# Patient Record
Sex: MALE | Race: WHITE | Employment: FULL TIME | URBAN - METROPOLITAN AREA
[De-identification: names, ages, dates, MRNs, and addresses within clinical notes are randomized per-mention and may not be internally consistent; named-entity substitution may affect disease eponyms.]

---

## 2024-01-09 ENCOUNTER — OFFICE VISIT (OUTPATIENT)
Facility: CLINIC | Age: 54
End: 2024-01-09
Payer: COMMERCIAL

## 2024-01-09 VITALS
HEIGHT: 72 IN | BODY MASS INDEX: 21.94 KG/M2 | DIASTOLIC BLOOD PRESSURE: 70 MMHG | SYSTOLIC BLOOD PRESSURE: 113 MMHG | HEART RATE: 63 BPM | OXYGEN SATURATION: 97 % | TEMPERATURE: 98.1 F | RESPIRATION RATE: 16 BRPM | WEIGHT: 162 LBS

## 2024-01-09 DIAGNOSIS — Z00.00 ENCOUNTER FOR GENERAL ADULT MEDICAL EXAMINATION WITHOUT ABNORMAL FINDINGS: Primary | ICD-10-CM

## 2024-01-09 LAB
ALBUMIN SERPL-MCNC: 4.1 G/DL (ref 3.5–5)
ALBUMIN/GLOB SERPL: 1.5 (ref 1.1–2.2)
ALP SERPL-CCNC: 69 U/L (ref 45–117)
ALT SERPL-CCNC: 23 U/L (ref 12–78)
ANION GAP SERPL CALC-SCNC: 5 MMOL/L (ref 5–15)
AST SERPL-CCNC: 13 U/L (ref 15–37)
BASOPHILS # BLD: 0 K/UL (ref 0–0.1)
BASOPHILS NFR BLD: 1 % (ref 0–1)
BILIRUB SERPL-MCNC: 0.6 MG/DL (ref 0.2–1)
BUN SERPL-MCNC: 18 MG/DL (ref 6–20)
BUN/CREAT SERPL: 16 (ref 12–20)
CALCIUM SERPL-MCNC: 8.8 MG/DL (ref 8.5–10.1)
CHLORIDE SERPL-SCNC: 105 MMOL/L (ref 97–108)
CHOLEST SERPL-MCNC: 178 MG/DL
CO2 SERPL-SCNC: 28 MMOL/L (ref 21–32)
COMMENT:: NORMAL
CREAT SERPL-MCNC: 1.1 MG/DL (ref 0.7–1.3)
DIFFERENTIAL METHOD BLD: NORMAL
EOSINOPHIL # BLD: 0 K/UL (ref 0–0.4)
EOSINOPHIL NFR BLD: 1 % (ref 0–7)
ERYTHROCYTE [DISTWIDTH] IN BLOOD BY AUTOMATED COUNT: 12.2 % (ref 11.5–14.5)
GLOBULIN SER CALC-MCNC: 2.8 G/DL (ref 2–4)
GLUCOSE SERPL-MCNC: 91 MG/DL (ref 65–100)
HCT VFR BLD AUTO: 41.3 % (ref 36.6–50.3)
HDLC SERPL-MCNC: 82 MG/DL
HDLC SERPL: 2.2 (ref 0–5)
HGB BLD-MCNC: 14.1 G/DL (ref 12.1–17)
IMM GRANULOCYTES # BLD AUTO: 0 K/UL (ref 0–0.04)
IMM GRANULOCYTES NFR BLD AUTO: 0 % (ref 0–0.5)
LDLC SERPL CALC-MCNC: 84 MG/DL (ref 0–100)
LYMPHOCYTES # BLD: 1.7 K/UL (ref 0.8–3.5)
LYMPHOCYTES NFR BLD: 38 % (ref 12–49)
MCH RBC QN AUTO: 30.4 PG (ref 26–34)
MCHC RBC AUTO-ENTMCNC: 34.1 G/DL (ref 30–36.5)
MCV RBC AUTO: 89 FL (ref 80–99)
MONOCYTES # BLD: 0.3 K/UL (ref 0–1)
MONOCYTES NFR BLD: 7 % (ref 5–13)
NEUTS SEG # BLD: 2.3 K/UL (ref 1.8–8)
NEUTS SEG NFR BLD: 53 % (ref 32–75)
NRBC # BLD: 0 K/UL (ref 0–0.01)
NRBC BLD-RTO: 0 PER 100 WBC
PLATELET # BLD AUTO: 227 K/UL (ref 150–400)
PMV BLD AUTO: 10 FL (ref 8.9–12.9)
POTASSIUM SERPL-SCNC: 4.2 MMOL/L (ref 3.5–5.1)
PROT SERPL-MCNC: 6.9 G/DL (ref 6.4–8.2)
PSA SERPL-MCNC: 0.6 NG/ML (ref 0.01–4)
RBC # BLD AUTO: 4.64 M/UL (ref 4.1–5.7)
SODIUM SERPL-SCNC: 138 MMOL/L (ref 136–145)
SPECIMEN HOLD: NORMAL
SPECIMEN HOLD: NORMAL
TRIGL SERPL-MCNC: 60 MG/DL
TSH SERPL DL<=0.05 MIU/L-ACNC: 1.19 UIU/ML (ref 0.36–3.74)
VLDLC SERPL CALC-MCNC: 12 MG/DL
WBC # BLD AUTO: 4.3 K/UL (ref 4.1–11.1)

## 2024-01-09 PROCEDURE — 99386 PREV VISIT NEW AGE 40-64: CPT | Performed by: NURSE PRACTITIONER

## 2024-01-09 SDOH — ECONOMIC STABILITY: INCOME INSECURITY: HOW HARD IS IT FOR YOU TO PAY FOR THE VERY BASICS LIKE FOOD, HOUSING, MEDICAL CARE, AND HEATING?: NOT HARD AT ALL

## 2024-01-09 SDOH — ECONOMIC STABILITY: HOUSING INSECURITY
IN THE LAST 12 MONTHS, WAS THERE A TIME WHEN YOU DID NOT HAVE A STEADY PLACE TO SLEEP OR SLEPT IN A SHELTER (INCLUDING NOW)?: NO

## 2024-01-09 SDOH — ECONOMIC STABILITY: FOOD INSECURITY: WITHIN THE PAST 12 MONTHS, YOU WORRIED THAT YOUR FOOD WOULD RUN OUT BEFORE YOU GOT MONEY TO BUY MORE.: NEVER TRUE

## 2024-01-09 SDOH — ECONOMIC STABILITY: FOOD INSECURITY: WITHIN THE PAST 12 MONTHS, THE FOOD YOU BOUGHT JUST DIDN'T LAST AND YOU DIDN'T HAVE MONEY TO GET MORE.: NEVER TRUE

## 2024-01-09 ASSESSMENT — PATIENT HEALTH QUESTIONNAIRE - PHQ9
6. FEELING BAD ABOUT YOURSELF - OR THAT YOU ARE A FAILURE OR HAVE LET YOURSELF OR YOUR FAMILY DOWN: 0
9. THOUGHTS THAT YOU WOULD BE BETTER OFF DEAD, OR OF HURTING YOURSELF: 0
4. FEELING TIRED OR HAVING LITTLE ENERGY: 0
5. POOR APPETITE OR OVEREATING: 0
SUM OF ALL RESPONSES TO PHQ9 QUESTIONS 1 & 2: 0
SUM OF ALL RESPONSES TO PHQ QUESTIONS 1-9: 0
SUM OF ALL RESPONSES TO PHQ QUESTIONS 1-9: 0
8. MOVING OR SPEAKING SO SLOWLY THAT OTHER PEOPLE COULD HAVE NOTICED. OR THE OPPOSITE, BEING SO FIGETY OR RESTLESS THAT YOU HAVE BEEN MOVING AROUND A LOT MORE THAN USUAL: 0
1. LITTLE INTEREST OR PLEASURE IN DOING THINGS: 0
10. IF YOU CHECKED OFF ANY PROBLEMS, HOW DIFFICULT HAVE THESE PROBLEMS MADE IT FOR YOU TO DO YOUR WORK, TAKE CARE OF THINGS AT HOME, OR GET ALONG WITH OTHER PEOPLE: 0
SUM OF ALL RESPONSES TO PHQ QUESTIONS 1-9: 0
SUM OF ALL RESPONSES TO PHQ QUESTIONS 1-9: 0
7. TROUBLE CONCENTRATING ON THINGS, SUCH AS READING THE NEWSPAPER OR WATCHING TELEVISION: 0
2. FEELING DOWN, DEPRESSED OR HOPELESS: 0
3. TROUBLE FALLING OR STAYING ASLEEP: 0

## 2024-01-09 ASSESSMENT — ENCOUNTER SYMPTOMS
EYES NEGATIVE: 1
COLOR CHANGE: 0
SHORTNESS OF BREATH: 0
GASTROINTESTINAL NEGATIVE: 1

## 2024-01-09 NOTE — PROGRESS NOTES
1. Have you been to the ER, urgent care clinic since your last visit?  Hospitalized since your last visit?No    2. Have you seen or consulted any other health care providers outside of the [facility] since your last visit?  Include any pap smears or colon screening. No    Chief Complaint   Patient presents with    Annual Exam     Health Maintenance Due   Topic Date Due    Hepatitis B vaccine (1 of 3 - 3-dose series) Never done    COVID-19 Vaccine (1) Never done    Depression Monitoring  Never done    HIV screen  Never done    Hepatitis C screen  Never done    Colorectal Cancer Screen  Never done    Shingles vaccine (1 of 2) Never done    Flu vaccine (1) 08/01/2023     PHQ-9 Total Score: 0 (1/9/2024 10:39 AM)  Thoughts that you would be better off dead, or of hurting yourself in some way: 0 (1/9/2024 10:39 AM)        1/9/2024    10:00 AM   AMB Abuse Screening   Do you ever feel afraid of your partner? N   Are you in a relationship with someone who physically or mentally threatens you? N   Is it safe for you to go home? Y     Vitals:    01/09/24 1039   BP: 113/70   Pulse: 63   Resp: 16   Temp: 98.1 °F (36.7 °C)   SpO2: 97%

## 2024-01-09 NOTE — PROGRESS NOTES
Assessment/Plan:     Modesto was seen today for annual exam.    Diagnoses and all orders for this visit:    Encounter for general adult medical examination without abnormal findings  -     CBC with Auto Differential; Future  -     Comprehensive Metabolic Panel; Future  -     Hemoglobin A1C; Future  -     Lipid Panel; Future  -     PSA Screening; Future  -     TSH; Future  -     Urinalysis, Micro; Future  -     Urinalysis, Micro  -     TSH  -     PSA Screening  -     Lipid Panel  -     Hemoglobin A1C  -     Comprehensive Metabolic Panel  -     CBC with Auto Differential  Patient was seen for annual physical exam. Reports being active with soccer and walking. He is current with dental and eye exam. Colonoscopy was 2-3 years ago. Will follow up with GI to see when next colonoscopy is due. Denies any blood in bowel movements were firm rectum. Patient is physically fit and good body weight. Physical exam was normal.       No follow-up provider specified.    Discussed expected course/resolution/complications of diagnosis in detail with patient.    Medication risks/benefits/costs/interactions/alternatives discussed with patient.    Pt was given after visit summary which includes diagnoses, current medications & vitals.   Pt expressed understanding with the diagnosis and plan        Subjective:      Modesto Fitzpatrick is a 53 y.o. male who presents for had concerns including Annual Exam.     Current Outpatient Medications   Medication Sig Dispense Refill   • escitalopram (LEXAPRO) 10 MG tablet Take 2 tablets by mouth daily       No current facility-administered medications for this visit.       Allergies   Allergen Reactions   • Apple Swelling   • Macadamia Nut Oil Angioedema, Hives, Itching, Rash and Swelling         Diagnosis Date   • Depression    • Food allergy     apple      Social History       Tobacco History       Smoking Status  Never      Smokeless Tobacco Use  Never              Alcohol History       Alcohol Use

## 2024-01-09 NOTE — PATIENT INSTRUCTIONS
We will follow up with labs when they return  Follow pu with GI to figure date of last Colonoscopy

## 2024-01-10 LAB
EST. AVERAGE GLUCOSE BLD GHB EST-MCNC: 97 MG/DL
HBA1C MFR BLD: 5 % (ref 4–5.6)

## 2025-02-13 ENCOUNTER — OFFICE VISIT (OUTPATIENT)
Age: 55
End: 2025-02-13
Payer: COMMERCIAL

## 2025-02-13 VITALS
OXYGEN SATURATION: 96 % | SYSTOLIC BLOOD PRESSURE: 108 MMHG | RESPIRATION RATE: 16 BRPM | DIASTOLIC BLOOD PRESSURE: 60 MMHG | BODY MASS INDEX: 21.94 KG/M2 | HEART RATE: 78 BPM | HEIGHT: 72 IN | WEIGHT: 162 LBS

## 2025-02-13 DIAGNOSIS — R25.3 FASCICULATION OF LOWER EXTREMITY: Primary | ICD-10-CM

## 2025-02-13 PROCEDURE — 99204 OFFICE O/P NEW MOD 45 MIN: CPT | Performed by: PSYCHIATRY & NEUROLOGY

## 2025-02-13 RX ORDER — ROPINIROLE 1 MG/1
1 TABLET, FILM COATED ORAL NIGHTLY
COMMUNITY
Start: 2025-01-20

## 2025-02-13 NOTE — PROGRESS NOTES
[facility] at [facility] at [address] [place] [id number] [facility] [address] [place] [address] [phone] Office  [phone] Facsimile           Referring: CARMEN Pennington    Chief Complaint   Patient presents with    New Patient     Referred by PCP (OLIVER Krishnan)    muscle spasms     X early 2024// muscle spasm and tightness in bilat lower legs - denies weakness, numbness, tingling / mother w/ MS // has been on requip 1 mg nightly x Oct (has helped)      History of Present Illness  54-year-old gentleman presenting today for initial neurologic consultation regarding muscle twitching in his bilateral lower extremities.    He has been experiencing persistent muscle twitching in his legs for approximately one year, describing the sensation as akin to small fireworks. These twitches are predominantly localized in his legs but occasionally manifest in other parts of his body. He reports no associated pain or weakness in his legs, although he does experience occasional weakness in the right leg that underwent knee surgery two years ago. He also reports severe cramping in his calves and arches during the night, for which he uses braces to stretch his legs. Despite the discomfort of wearing these braces, they provide some relief. He has attempted various interventions, including increasing his magnesium intake, maintaining hydration, and using orthopedic shoes, but these have not significantly improved his symptoms. He reports no instances of focal weakness of his legs, changes in his voice, or difficulty swallowing. He has not observed any decrease in muscle mass. His general physician conducted blood work in July, which revealed slightly low iron levels, and given no metabolic abnormalities this prompted a referral.  He has been under the care of a psychiatrist who

## 2025-02-25 ENCOUNTER — PROCEDURE VISIT (OUTPATIENT)
Age: 55
End: 2025-02-25
Payer: COMMERCIAL

## 2025-02-25 DIAGNOSIS — R25.3 FASCICULATION OF LOWER EXTREMITY: Primary | ICD-10-CM

## 2025-02-25 PROCEDURE — 95886 MUSC TEST DONE W/N TEST COMP: CPT | Performed by: PSYCHIATRY & NEUROLOGY

## 2025-02-25 PROCEDURE — 95911 NRV CNDJ TEST 9-10 STUDIES: CPT | Performed by: PSYCHIATRY & NEUROLOGY

## 2025-02-25 NOTE — PROGRESS NOTES
18.4 >4.0 Calf Lat Mall 14.0   Site 2    3.8  24.2         Motor Summary Table     Stim Site NR Onset (ms) Norm Onset (ms) O-P Amp (mV) Norm O-P Amp Amp (Prev) (%) Site1 Site2 Dist (cm) Chicho (m/s) Norm Chicho (m/s)   Left Fibular Motor (Ext Dig Brev)  31.7 °C   Ankle    4.8 <6.5 5.4 >2.6 100.0 Ankle Ext Dig Brev 8.0     B Fib    11.7  5.8  107.4 B Fib Ankle 34.0 49 >38   Poplt    13.7  5.7  98.3 Poplt B Fib 10.0 50 >42   Right Fibular Motor (Ext Dig Brev)  31.4 °C   Ankle    4.1 <6.5 4.2 >2.6 100.0 Ankle Ext Dig Brev 8.0     B Fib    11.1  4.7  111.9 B Fib Ankle 34.0 49 >38   Poplt    13.4  4.4  93.6 Poplt B Fib 10.0 43 >42   Left Tibial Motor (Abd Molina Brev)  31.8 °C   Ankle    4.4 <6.1 10.5 >5.3 100.0 Ankle Abd Molina Brev 8.0     Knee    13.4  8.9  84.8 Knee Ankle 39.0 43 >39   Right Tibial Motor (Abd Molina Brev)  31.7 °C   Ankle    4.1 <6.1 10.0 >5.3 100.0 Ankle Abd Molina Brev 8.0     Knee    13.6  7.8  78.0 Knee Ankle 42.0 44 >39     F Wave Studies     NR F-Lat (ms) Lat Norm (ms) L-R F-Lat (ms) L-R Lat Norm   Left Tibial (Mrkrs) (Abd Hallucis)  31.7 °C      54.15 <56 0.70 <5.7   Right Tibial (Mrkrs) (Abd Hallucis)  31.7 °C      54.85 <56 0.70 <5.7     H Reflex Studies     NR H-Lat (ms) L-R H-Lat (ms) L-R Lat Norm   Left Tibial (Gastroc)  31.7 °C      31.94 0.00 <2.0   Right Tibial (Gastroc)  31.7 °C      31.94 0.00 <2.0       EMG     Side Muscle Nerve Root Ins Act Fibs Psw Recrt Duration Amp Poly Comment   Left VastusLat Femoral L2-4 Nml Nml Nml Nml Nml Nml Nml    Left MedGastroc Tibial S1-2 Nml Nml Nml Nml Nml Nml Nml    Left AntTibialis Dp Br Peron L4-5 Nml Nml Nml Nml Nml Nml Nml    Left Peroneus Long   Nml Nml Nml Nml Nml Nml Nml    Left TensorFascLat SupGluteal L4-5, S1 Nml Nml Nml Nml Nml Nml Nml    Left Lower Lumb Parasp Rami L5,S1 Nml Nml Nml Nml Nml Nml Nml    Left Mid Lumb Parasp Rami L4,5 Nml Nml Nml Nml Nml Nml Nml    Right VastusLat Femoral L2-4 Nml Nml Nml Nml Nml Nml Nml    Right MedGastroc Tibial S1-2